# Patient Record
Sex: MALE | URBAN - METROPOLITAN AREA
[De-identification: names, ages, dates, MRNs, and addresses within clinical notes are randomized per-mention and may not be internally consistent; named-entity substitution may affect disease eponyms.]

---

## 2018-05-25 ENCOUNTER — APPOINTMENT (OUTPATIENT)
Age: 28
Setting detail: DERMATOLOGY
End: 2018-05-25

## 2018-05-25 DIAGNOSIS — L13.0 DERMATITIS HERPETIFORMIS: ICD-10-CM

## 2018-05-25 PROCEDURE — OTHER MIPS QUALITY: OTHER

## 2018-05-25 PROCEDURE — OTHER COUNSELING: OTHER

## 2018-05-25 PROCEDURE — 99202 OFFICE O/P NEW SF 15 MIN: CPT

## 2018-05-25 PROCEDURE — OTHER PRESCRIPTION: OTHER

## 2018-05-25 RX ORDER — DAPSONE 50 MG/G
GEL TOPICAL
Qty: 1 | Refills: 0 | COMMUNITY
Start: 2018-05-25

## 2018-05-25 RX ORDER — DAPSONE 25 MG/1
TABLET ORAL BID
Qty: 60 | Refills: 0 | COMMUNITY
Start: 2018-05-25

## 2018-05-25 NOTE — PROCEDURE: MIPS QUALITY
Detail Level: Detailed
Quality 111:Pneumonia Vaccination Status For Older Adults: Pneumococcal Vaccination not Administered or Previously Received, Reason not Otherwise Specified
Quality 110: Preventive Care And Screening: Influenza Immunization: Influenza Immunization not Administered because Patient Refused.
Quality 226: Preventive Care And Screening: Tobacco Use: Screening And Cessation Intervention: Patient screened for tobacco and is a smoker AND received Cessation Counseling